# Patient Record
Sex: MALE | Race: WHITE
[De-identification: names, ages, dates, MRNs, and addresses within clinical notes are randomized per-mention and may not be internally consistent; named-entity substitution may affect disease eponyms.]

---

## 2019-12-29 ENCOUNTER — HOSPITAL ENCOUNTER (EMERGENCY)
Dept: HOSPITAL 8 - ED | Age: 24
Discharge: HOME | End: 2019-12-29
Payer: COMMERCIAL

## 2019-12-29 VITALS — WEIGHT: 133.6 LBS | BODY MASS INDEX: 17.71 KG/M2 | HEIGHT: 73 IN

## 2019-12-29 VITALS — DIASTOLIC BLOOD PRESSURE: 81 MMHG | SYSTOLIC BLOOD PRESSURE: 130 MMHG

## 2019-12-29 DIAGNOSIS — Z72.89: ICD-10-CM

## 2019-12-29 DIAGNOSIS — M13.171: Primary | ICD-10-CM

## 2019-12-29 DIAGNOSIS — M10.9: ICD-10-CM

## 2019-12-29 PROCEDURE — 96372 THER/PROPH/DIAG INJ SC/IM: CPT

## 2019-12-29 PROCEDURE — 99283 EMERGENCY DEPT VISIT LOW MDM: CPT

## 2019-12-29 PROCEDURE — 73630 X-RAY EXAM OF FOOT: CPT

## 2019-12-29 NOTE — NUR
D/C INSTRUCTIONS, MEDS & F/U APPT RV'WD WITH PT, HE VERBALIZES UNDERSTANDING. 
RX GIVEN X2. ASSISTED PT OUT OF ED VIA WC WITH GIRLFRIEND.